# Patient Record
Sex: FEMALE | Race: BLACK OR AFRICAN AMERICAN
[De-identification: names, ages, dates, MRNs, and addresses within clinical notes are randomized per-mention and may not be internally consistent; named-entity substitution may affect disease eponyms.]

---

## 2018-02-12 ENCOUNTER — HOSPITAL ENCOUNTER (OUTPATIENT)
Dept: HOSPITAL 84 - D.MRI | Age: 43
Discharge: HOME | End: 2018-02-12
Attending: ORTHOPAEDIC SURGERY
Payer: COMMERCIAL

## 2018-02-12 DIAGNOSIS — M24.10: Primary | ICD-10-CM

## 2018-03-05 ENCOUNTER — HOSPITAL ENCOUNTER (OUTPATIENT)
Dept: HOSPITAL 84 - D.OPS | Age: 43
Discharge: HOME | End: 2018-03-05
Attending: ORTHOPAEDIC SURGERY
Payer: COMMERCIAL

## 2018-03-05 VITALS — BODY MASS INDEX: 45.3 KG/M2

## 2018-03-05 VITALS — SYSTOLIC BLOOD PRESSURE: 124 MMHG | DIASTOLIC BLOOD PRESSURE: 84 MMHG

## 2018-03-05 DIAGNOSIS — M25.562: ICD-10-CM

## 2018-03-05 DIAGNOSIS — S83.242A: Primary | ICD-10-CM

## 2018-03-05 DIAGNOSIS — Z01.812: ICD-10-CM

## 2018-03-05 DIAGNOSIS — E66.01: ICD-10-CM

## 2018-03-05 LAB
ERYTHROCYTE [DISTWIDTH] IN BLOOD BY AUTOMATED COUNT: 14 % (ref 11.5–14.5)
HCT VFR BLD CALC: 45.9 % (ref 36–48)
HGB BLD-MCNC: 15.5 G/DL (ref 12–16)
MCH RBC QN AUTO: 30.2 PG (ref 26–34)
MCHC RBC AUTO-ENTMCNC: 33.8 G/DL (ref 31–37)
MCV RBC: 89.3 FL (ref 80–100)
PLATELET # BLD: 302 10X3/UL (ref 130–400)
PMV BLD AUTO: 10.8 FL (ref 7.4–10.4)
RBC # BLD AUTO: 5.14 10X6/UL (ref 4–5.4)
WBC # BLD AUTO: 4.9 10X3/UL (ref 4.8–10.8)

## 2023-11-17 ENCOUNTER — OFFICE VISIT (OUTPATIENT)
Dept: URGENT CARE | Facility: CLINIC | Age: 48
End: 2023-11-17
Payer: COMMERCIAL

## 2023-11-17 VITALS
TEMPERATURE: 97.3 F | RESPIRATION RATE: 18 BRPM | DIASTOLIC BLOOD PRESSURE: 81 MMHG | HEART RATE: 84 BPM | SYSTOLIC BLOOD PRESSURE: 163 MMHG | OXYGEN SATURATION: 95 %

## 2023-11-17 DIAGNOSIS — L02.91 ABSCESS: Primary | ICD-10-CM

## 2023-11-17 PROCEDURE — 87075 CULTR BACTERIA EXCEPT BLOOD: CPT

## 2023-11-17 PROCEDURE — 99204 OFFICE O/P NEW MOD 45 MIN: CPT | Performed by: PHYSICIAN ASSISTANT

## 2023-11-17 PROCEDURE — 87185 SC STD ENZYME DETCJ PER NZM: CPT

## 2023-11-17 PROCEDURE — 87070 CULTURE OTHR SPECIMN AEROBIC: CPT

## 2023-11-17 PROCEDURE — 10060 I&D ABSCESS SIMPLE/SINGLE: CPT | Performed by: PHYSICIAN ASSISTANT

## 2023-11-17 RX ORDER — SULFAMETHOXAZOLE AND TRIMETHOPRIM 800; 160 MG/1; MG/1
1 TABLET ORAL 2 TIMES DAILY
Qty: 20 TABLET | Refills: 0 | Status: SHIPPED | OUTPATIENT
Start: 2023-11-17 | End: 2023-11-27

## 2023-11-17 ASSESSMENT — ENCOUNTER SYMPTOMS
ABDOMINAL PAIN: 0
FEVER: 0
COUGH: 0
NAUSEA: 0
PSYCHIATRIC NEGATIVE: 1
VOMITING: 0
APPETITE CHANGE: 0
NEUROLOGICAL NEGATIVE: 1
SINUS PRESSURE: 0
ENDOCRINE NEGATIVE: 1
WHEEZING: 0
RHINORRHEA: 0
ALLERGIC/IMMUNOLOGIC NEGATIVE: 1
SORE THROAT: 0
BACK PAIN: 0
EYE PAIN: 0
ACTIVITY CHANGE: 0
FLANK PAIN: 0
FATIGUE: 0
DYSURIA: 0
COLOR CHANGE: 1
HEMATOLOGIC/LYMPHATIC NEGATIVE: 1
DIARRHEA: 0
SHORTNESS OF BREATH: 0
ARTHRALGIAS: 0
EYE DISCHARGE: 0
BLOOD IN STOOL: 0
EYE REDNESS: 0

## 2023-11-17 NOTE — PROGRESS NOTES
Subjective   Patient ID: Elizabeth Morse is a 47 y.o. female who presents for Abscess.  Patient notes that the abscess has been forming for the last 4 days she does have a history of abscesses in the past.  She denies any fevers or chills denies any nausea vomiting diarrhea or abdominal pain.  Denies any abdominal pain outside of the area of the abscess.  The abscess is located on the inferior aspect of her panniculus      Review of Systems   Constitutional:  Negative for activity change, appetite change, fatigue and fever.   HENT:  Negative for congestion, rhinorrhea, sinus pressure and sore throat.    Eyes:  Negative for pain, discharge and redness.   Respiratory:  Negative for cough, shortness of breath and wheezing.    Cardiovascular:  Negative for chest pain and leg swelling.   Gastrointestinal:  Negative for abdominal pain, blood in stool, diarrhea, nausea and vomiting.   Endocrine: Negative.    Genitourinary:  Negative for dysuria and flank pain.   Musculoskeletal:  Negative for arthralgias, back pain and gait problem.   Skin:  Positive for color change. Negative for rash.   Allergic/Immunologic: Negative.    Neurological: Negative.    Hematological: Negative.    Psychiatric/Behavioral: Negative.         Objective   /81   Pulse 84   Temp 36.3 °C (97.3 °F)   Resp 18   SpO2 95%   Physical Exam  Constitutional:       General: She is not in acute distress.     Appearance: Normal appearance. She is not ill-appearing, toxic-appearing or diaphoretic.   HENT:      Head: Normocephalic and atraumatic.      Mouth/Throat:      Mouth: Mucous membranes are moist.      Pharynx: Oropharynx is clear.   Eyes:      Conjunctiva/sclera: Conjunctivae normal.   Cardiovascular:      Rate and Rhythm: Normal rate and regular rhythm.      Heart sounds: No murmur heard.  Pulmonary:      Effort: Pulmonary effort is normal. No respiratory distress.      Breath sounds: Normal breath sounds. No wheezing.   Musculoskeletal:          General: No swelling, tenderness, deformity or signs of injury. Normal range of motion.      Cervical back: Normal range of motion and neck supple.   Skin:     General: Skin is warm and dry.      Findings: Erythema present.      Comments: Patient with a large fluctuant abscess with area of central fluctuance 2 cm in diameter.  There is surrounding erythema to the panniculus.  No significant induration of the panniculus outside of the area immediately surrounding the abscess   Neurological:      General: No focal deficit present.      Mental Status: She is alert and oriented to person, place, and time.      Gait: Gait normal.         Patient ID: Elizabeth Morse is a 47 y.o. female.    Incision and Drainage    Date/Time: 11/17/2023 11:42 AM    Performed by: Miky Haq PA-C  Authorized by: Miky Haq PA-C    Consent:     Consent obtained:  Verbal    Risks discussed:  Pain, infection and bleeding    Alternatives discussed:  Alternative treatment  Universal protocol:     Patient identity confirmed:  Verbally with patient  Location:     Type:  Abscess    Location:  Trunk    Trunk location:  Abdomen  Pre-procedure details:     Skin preparation:  Povidone-iodine  Sedation:     Sedation type:  None  Anesthesia:     Anesthesia method:  Local infiltration    Local anesthetic:  Lidocaine 1% w/o epi  Procedure type:     Complexity:  Simple  Procedure details:     Incision types:  Stab incision    Incision depth:  Dermal    Wound management:  Probed and deloculated    Drainage:  Bloody and purulent    Drainage amount:  Moderate    Wound treatment:  Wound left open    Packing materials:  None  Post-procedure details:     Procedure completion:  Tolerated      Assessment/Plan   Problem List Items Addressed This Visit       Abscess - Primary    Relevant Medications    sulfamethoxazole-trimethoprim (Bactrim DS) 800-160 mg tablet    Other Relevant Orders    Tissue/Wound Culture/Smear      -Incision and drainage performed  by NP student under my guidance  -Patient tolerated the procedure though there was some pain especially with the injection of lidocaine  -Wound culture obtained  -Starting the patient on 10-day course of Bactrim  -Discussed with patient if there is any development of fevers or worsening pain to the abdomen she needs to proceed to the emergency room for further evaluation and management

## 2023-11-17 NOTE — PATIENT INSTRUCTIONS
Assessment/Plan   Problem List Items Addressed This Visit       Abscess - Primary    Relevant Medications    sulfamethoxazole-trimethoprim (Bactrim DS) 800-160 mg tablet    Other Relevant Orders    Tissue/Wound Culture/Smear      -Incision and drainage performed by NP student under my guidance  -Patient tolerated the procedure though there was some pain especially with the injection of lidocaine  -Wound culture obtained  -Starting the patient on 10-day course of Bactrim  -Discussed with patient if there is any development of fevers or worsening pain to the abdomen she needs to proceed to the emergency room for further evaluation and management

## 2023-11-20 LAB
B-LACTAMASE ORGANISM ISLT: POSITIVE
BACTERIA SPEC CULT: ABNORMAL
GRAM STN SPEC: ABNORMAL
GRAM STN SPEC: ABNORMAL